# Patient Record
(demographics unavailable — no encounter records)

---

## 2024-10-09 NOTE — HISTORY OF PRESENT ILLNESS
[___ Week(s) Ago] : [unfilled] week(s) ago [FreeTextEntry1] : 10/9/24  MRI pelvis. -  No stigmata of an inflammatory arthropathy at the sacroiliac joints. 7/30/24  + HLA B 27 no history of skin psoriasis or IBD symptoms taking NSAID , has been briefly on prednisone that improved pain - lower back pain but was also taking NSAID same time  intermittent morning stiffness after break from work back pain got better  persistent R knee pain worse with activity   5/22/24 There is superficial chondral fibrillation along the median ridge and medial patellar facet without associated subchondral signal abnormality. No full-thickness trochlear chondral defect. The patellar retinacula are intact.

## 2024-10-09 NOTE — ASSESSMENT
[FreeTextEntry1] : 44 y old F with PMH of Multiple joint pain and muscle pain getting worse for last 2 years also , dry skin around lips , in the past had history of + CATHERINE, had negative CATHERINE 5/24 with PMD , due to worse joint pain repeat CATHERINE was 1:40 6/24 and Borderline RNP at 1 , negative Muller, RF and CCP , normal ESR and CRP 6/24  -with workup + HLA B 27 7/24  that we can see with seronegative inflammatory arthritis pelvic x ray no sig change but evaluate for inflammatory back pain with + HLA B27 with Pelvic MRI 10/5/24 --> no changes of SI joints no major tendon tear, Possible L sided Trochanter - tendinopathy no sig pain of hip , mostly lower back pain that improved after break from work -also mostly R knee pain with activity no sig swelling, improves with NSAID   s/p R knee arthroscopic surgery at [facility] 9/23 no improvement of pain, then had trigger point injections without improvement , switched care to Dr Quintanilla referred for PT does not see improvement in pain , tx NSAID   R knee pain with flexion and walking up and down stairs with worse pain, occasional swelling, Morning stiffness  improves with Ibuprofen over 1 hour , some days of week   Bl hand pain, bl shoulder upper and lower back , currently worse lower back had Cervical x ray was told ,  since 3/24 lower back stiffness   -with symptoms possible early seronegative arthritis  advised trial of prednisone again if markedly improved pain we can try DMARD then as no Radiologic involvement of SI joints for now , patient prefers to cw NSAID for now   follow up in person 6-8 weeks